# Patient Record
Sex: MALE | Race: WHITE | NOT HISPANIC OR LATINO | Employment: FULL TIME | URBAN - METROPOLITAN AREA
[De-identification: names, ages, dates, MRNs, and addresses within clinical notes are randomized per-mention and may not be internally consistent; named-entity substitution may affect disease eponyms.]

---

## 2024-06-29 ENCOUNTER — HOSPITAL ENCOUNTER (EMERGENCY)
Facility: HOSPITAL | Age: 49
Discharge: HOME | End: 2024-06-29
Attending: EMERGENCY MEDICINE
Payer: COMMERCIAL

## 2024-06-29 ENCOUNTER — APPOINTMENT (OUTPATIENT)
Dept: RADIOLOGY | Facility: HOSPITAL | Age: 49
End: 2024-06-29
Payer: COMMERCIAL

## 2024-06-29 VITALS
WEIGHT: 250 LBS | SYSTOLIC BLOOD PRESSURE: 147 MMHG | OXYGEN SATURATION: 92 % | HEART RATE: 80 BPM | DIASTOLIC BLOOD PRESSURE: 88 MMHG | TEMPERATURE: 98 F | RESPIRATION RATE: 16 BRPM | BODY MASS INDEX: 33.86 KG/M2 | HEIGHT: 72 IN

## 2024-06-29 DIAGNOSIS — S06.0X0A CONCUSSION WITHOUT LOSS OF CONSCIOUSNESS, INITIAL ENCOUNTER: Primary | ICD-10-CM

## 2024-06-29 PROCEDURE — 96374 THER/PROPH/DIAG INJ IV PUSH: CPT

## 2024-06-29 PROCEDURE — 96375 TX/PRO/DX INJ NEW DRUG ADDON: CPT

## 2024-06-29 PROCEDURE — 72125 CT NECK SPINE W/O DYE: CPT

## 2024-06-29 PROCEDURE — 99285 EMERGENCY DEPT VISIT HI MDM: CPT | Mod: 25

## 2024-06-29 PROCEDURE — 70450 CT HEAD/BRAIN W/O DYE: CPT | Performed by: RADIOLOGY

## 2024-06-29 PROCEDURE — 70450 CT HEAD/BRAIN W/O DYE: CPT

## 2024-06-29 PROCEDURE — 2500000004 HC RX 250 GENERAL PHARMACY W/ HCPCS (ALT 636 FOR OP/ED): Performed by: EMERGENCY MEDICINE

## 2024-06-29 PROCEDURE — 72125 CT NECK SPINE W/O DYE: CPT | Performed by: RADIOLOGY

## 2024-06-29 RX ORDER — FENTANYL CITRATE 50 UG/ML
50 INJECTION, SOLUTION INTRAMUSCULAR; INTRAVENOUS ONCE
Status: COMPLETED | OUTPATIENT
Start: 2024-06-29 | End: 2024-06-29

## 2024-06-29 RX ORDER — ONDANSETRON HYDROCHLORIDE 2 MG/ML
4 INJECTION, SOLUTION INTRAVENOUS ONCE
Status: COMPLETED | OUTPATIENT
Start: 2024-06-29 | End: 2024-06-29

## 2024-06-29 RX ADMIN — FENTANYL CITRATE 50 MCG: 50 INJECTION INTRAMUSCULAR; INTRAVENOUS at 20:34

## 2024-06-29 RX ADMIN — ONDANSETRON 4 MG: 2 INJECTION INTRAMUSCULAR; INTRAVENOUS at 20:34

## 2024-06-29 ASSESSMENT — PAIN SCALES - GENERAL
PAINLEVEL_OUTOF10: 4
PAINLEVEL_OUTOF10: 3

## 2024-06-29 ASSESSMENT — LIFESTYLE VARIABLES
EVER FELT BAD OR GUILTY ABOUT YOUR DRINKING: NO
HAVE PEOPLE ANNOYED YOU BY CRITICIZING YOUR DRINKING: NO
TOTAL SCORE: 0
EVER HAD A DRINK FIRST THING IN THE MORNING TO STEADY YOUR NERVES TO GET RID OF A HANGOVER: NO
HAVE YOU EVER FELT YOU SHOULD CUT DOWN ON YOUR DRINKING: NO

## 2024-06-29 ASSESSMENT — PAIN DESCRIPTION - FREQUENCY: FREQUENCY: CONSTANT/CONTINUOUS

## 2024-06-29 ASSESSMENT — COLUMBIA-SUICIDE SEVERITY RATING SCALE - C-SSRS
1. IN THE PAST MONTH, HAVE YOU WISHED YOU WERE DEAD OR WISHED YOU COULD GO TO SLEEP AND NOT WAKE UP?: NO
6. HAVE YOU EVER DONE ANYTHING, STARTED TO DO ANYTHING, OR PREPARED TO DO ANYTHING TO END YOUR LIFE?: NO
2. HAVE YOU ACTUALLY HAD ANY THOUGHTS OF KILLING YOURSELF?: NO

## 2024-06-29 ASSESSMENT — PAIN DESCRIPTION - LOCATION: LOCATION: HEAD

## 2024-06-29 ASSESSMENT — PAIN DESCRIPTION - DESCRIPTORS: DESCRIPTORS: SQUEEZING;PRESSURE

## 2024-06-29 ASSESSMENT — PAIN - FUNCTIONAL ASSESSMENT: PAIN_FUNCTIONAL_ASSESSMENT: 0-10

## 2024-06-29 ASSESSMENT — PAIN DESCRIPTION - PAIN TYPE: TYPE: CHRONIC PAIN

## 2024-06-29 NOTE — Clinical Note
García Harry was seen and treated in our emergency department on 6/29/2024.  He may return to work on 07/08/2024.       If you have any questions or concerns, please don't hesitate to call.      Georgia Morrison MD

## 2024-06-29 NOTE — ED TRIAGE NOTES
"Patient arrived to ED from work with c/o falling backwards off semi, approx. 2ft. Patient reports his legs gave out when he went to step up onto the lift of the semi, [facility] but they are still working on getting him to a neurologist. Patient denies LOC, denies blood thinners. Patient report he hit left parietal side of head, has a red [place] , patient reports some \"seepage\" of blood on sight, no bleeding noted upon arrival to ED. Patients wife noticed that patients speech changed, he a 1 bought of emesis on ride to ED. Patient is nauseous. Patient noticed pupil changes, reports they were smaller. Patient is stable at this time.   "

## 2024-06-30 NOTE — ED PROVIDER NOTES
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  He states that his legs felt weak and somewhat gave out on him.  He fell over backwards.  He did hit his head.  He did not have loss of consciousness.  He initially seemed fine.  They were monitoring him at home.  Wife states that he started to sound strange like he was underwater and she had difficulty getting him focused.  On the way to the emergency department he did have nausea with vomiting x 1.  He is not on any blood thinning medications.  Patient has chronic weakness that has been going on for months to a year.  He [facility] but has not been able to be seen yet.  He states that he has difficulty when he is laying on the floor to be able to stand without using something to support himself.  This is not a new problem for him this is something it has been going on for a while.                          Ruby Coma Scale Score: 15                  Patient History   No past medical history on file.  Past Surgical History:   Procedure Laterality Date    MOUTH SURGERY  08/16/2013    Oral Surgery Tooth Extraction     No family history on file.  Social History     Tobacco Use    Smoking status: Not on file    Smokeless tobacco: Not on file   Substance Use Topics    Alcohol use: Not on file    Drug use: Not on file       Physical Exam   ED Triage Vitals [06/29/24 1939]   Temperature Heart Rate Respirations BP   36.7 °C (98 °F) 86 (!) 21 143/89      Pulse Ox Temp Source Heart Rate Source Patient Position   97 % Oral Monitor --      BP Location FiO2 (%)     -- --       Physical Exam  Vitals and nursing note reviewed.   Constitutional:       Appearance: Normal appearance.   HENT:      Head:      Comments: Patient has an  abrasion to the left side of his scalp.  No laceration.  No active bleeding.  He does have tenderness to this area.     Right Ear: Tympanic membrane normal.      Left Ear: Tympanic membrane normal.      Nose: Nose normal.      Mouth/Throat:      Mouth: Mucous membranes are moist.   Eyes:      Extraocular Movements: Extraocular movements intact.      Pupils: Pupils are equal, round, and reactive to light.   Cardiovascular:      Rate and Rhythm: Normal rate and regular rhythm.      Pulses: Normal pulses.      Heart sounds: Normal heart sounds.   Pulmonary:      Effort: Pulmonary effort is normal.      Breath sounds: Normal breath sounds.   Abdominal:      General: Abdomen is flat. Bowel sounds are normal.      Palpations: Abdomen is soft.   Musculoskeletal:         General: Normal range of motion.      Cervical back: Normal range of motion. No tenderness.   Skin:     General: Skin is warm and dry.      Capillary Refill: Capillary refill takes less than 2 seconds.   Neurological:      General: No focal deficit present.      Mental Status: He is alert and oriented to person, place, and time.   Psychiatric:         Mood and Affect: Mood normal.         Behavior: Behavior normal.       Labs Reviewed - No data to display  Pain Management Panel           No data to display              CT head wo IV contrast    (Results Pending)   CT cervical spine wo IV contrast    (Results Pending)     ED Course & MDM   Diagnoses as of 06/29/24 2204   Concussion without loss of consciousness, initial encounter       Medical Decision Making  Patient presents secondary to a fall with head injury.  Patient did have nausea with vomiting.  Patient also has seemed off since the fall.  Patient is evaluated in the emergency department CT head and cervical spine.  CT head and cervical spine shows no evidence of acute traumatic injury.  Patient has dizziness and nausea which is consistent with a concussion.  At this time patient should not be  driving or operating machinery if he has concussion symptoms.  He is written off work for a week.  He is to follow-up with employee health for extension of work restrictions if needed.  He is stable for discharge at this time.        Procedure  Procedures     Georgia Morrison MD  06/29/24 6004

## 2025-02-04 ENCOUNTER — OFFICE VISIT (OUTPATIENT)
Dept: URGENT CARE | Age: 50
End: 2025-02-04
Payer: OTHER GOVERNMENT

## 2025-02-04 VITALS
SYSTOLIC BLOOD PRESSURE: 136 MMHG | RESPIRATION RATE: 16 BRPM | TEMPERATURE: 96.7 F | HEART RATE: 93 BPM | DIASTOLIC BLOOD PRESSURE: 86 MMHG | OXYGEN SATURATION: 96 %

## 2025-02-04 DIAGNOSIS — J02.9 PHARYNGITIS, UNSPECIFIED ETIOLOGY: Primary | ICD-10-CM

## 2025-02-04 DIAGNOSIS — J02.9 SORE THROAT: ICD-10-CM

## 2025-02-04 LAB — POC RAPID STREP: NEGATIVE

## 2025-02-04 RX ORDER — AMOXICILLIN 500 MG/1
500 CAPSULE ORAL 2 TIMES DAILY
Qty: 20 CAPSULE | Refills: 0 | Status: SHIPPED | OUTPATIENT
Start: 2025-02-04 | End: 2025-02-14

## 2025-02-04 NOTE — PROGRESS NOTES
Subjective   Patient ID: García Harry is a 50 y.o. male. They present today with a chief complaint of Sore Throat (Sore throat x 2 days, son has strep).    History of Present Illness  Klaus Harry is a 50 y.o. male. They present today with a chief complaint of Sore Throat (Sore throat x 2 days, son has strep). Here for strep test    Past Medical History  Allergies as of 02/04/2025    (No Known Allergies)       (Not in a hospital admission)       No past medical history on file.    Past Surgical History:   Procedure Laterality Date    MOUTH SURGERY  08/16/2013    Oral Surgery Tooth Extraction            Review of Systems  Review of Systems  Sore throat    Objective    Vitals:    02/04/25 1223   BP: 136/86   Pulse: 93   Resp: 16   Temp: 35.9 °C (96.7 °F)   SpO2: 96%     No LMP for male patient.    Physical Exam  Erythematous pharynx  Procedures    Point of Care Test & Imaging Results from this visit  Results for orders placed or performed in visit on 02/04/25   POCT rapid strep A manually resulted   Result Value Ref Range    POC Rapid Strep Negative Negative      No results found.    Diagnostic study results (if any) were reviewed by CATARINO Wolf.    Assessment/Plan   Allergies, medications, history, and pertinent labs/EKGs/Imaging reviewed by CATARINO Wolf.     Medical Decision Making  - strep test however given exposure, reasonable for antibx for phayrngitis    Amox sent    Otc tylenol and motrin as discussed    Follow up with pcp    As a result of the work-up, the patient was discharged home.  he was informed of his diagnosis and instructed to come back with any concerns or worsening of condition.  he and was agreeable to the plan as discussed above.  he was given the opportunity to ask questions.  All of the patient's questions were answered.    This document was generated using the assistance of voice recognition software. If there are any errors of spelling, grammar, syntax,  or meaning; please feel free to contact me directly for clarification.     Orders and Diagnoses  Diagnoses and all orders for this visit:  Pharyngitis, unspecified etiology  -     amoxicillin (Amoxil) 500 mg capsule; Take 1 capsule (500 mg) by mouth 2 times a day for 10 days.  Sore throat  -     POCT rapid strep A manually resulted  -     amoxicillin (Amoxil) 500 mg capsule; Take 1 capsule (500 mg) by mouth 2 times a day for 10 days.      Medical Admin Record      Patient disposition: Home    Electronically signed by CATARINO Wolf  12:37 PM

## 2025-03-14 ENCOUNTER — OFFICE VISIT (OUTPATIENT)
Dept: URGENT CARE | Age: 50
End: 2025-03-14
Payer: OTHER GOVERNMENT

## 2025-03-14 VITALS
DIASTOLIC BLOOD PRESSURE: 84 MMHG | OXYGEN SATURATION: 98 % | HEART RATE: 88 BPM | TEMPERATURE: 98.7 F | RESPIRATION RATE: 16 BRPM | SYSTOLIC BLOOD PRESSURE: 131 MMHG

## 2025-03-14 DIAGNOSIS — A08.4 VIRAL GASTROENTERITIS: Primary | ICD-10-CM

## 2025-03-14 ASSESSMENT — ENCOUNTER SYMPTOMS
CONSTITUTIONAL NEGATIVE: 1
NAUSEA: 1
VOMITING: 1
DIARRHEA: 1

## 2025-03-14 NOTE — LETTER
March 14, 2025     Patient: García Harry   YOB: 1975   Date of Visit: 3/14/2025       To Whom It May Concern:    García Harry was seen in my clinic on 3/14/2025 at 8:00 am. Please excuse García for his absence from work on this day to make the appointment.    If you have any questions or concerns, please don't hesitate to call.         Sincerely,         Jose Juan Monterroso PA-C        CC: No Recipients

## 2025-03-14 NOTE — PROGRESS NOTES
Subjective   Patient ID: García Harry is a 50 y.o. male. They present today with a chief complaint of N/V/D.    History of Present Illness  50-year-old male presents to clinic today with complaints of nausea vomiting diarrhea.  Patient states this been ongoing for about 2 days.  He originally started Wednesday night.  He states he has had some aches and chills.  Denies any true fever.  Denies blood in the stool.  Denies recent long travel.  Denies recent antibiotic usage.  He has been taking Pepto-Bismol which has helped with the diarrhea however is still ongoing.  He states that nausea has improved.  Originally had diffuse abdominal pain but that is also improved.          Past Medical History  Allergies as of 03/14/2025    (No Known Allergies)       (Not in a hospital admission)       No past medical history on file.    Past Surgical History:   Procedure Laterality Date    MOUTH SURGERY  08/16/2013    Oral Surgery Tooth Extraction            Review of Systems  Review of Systems   Constitutional: Negative.    Gastrointestinal:  Positive for diarrhea, nausea and vomiting.                                  Objective    Vitals:    03/14/25 0814   BP: 131/84   Pulse: 88   Resp: 16   Temp: 37.1 °C (98.7 °F)   SpO2: 98%     No LMP for male patient.    Physical Exam  Constitutional:       Appearance: Normal appearance.   Cardiovascular:      Rate and Rhythm: Normal rate and regular rhythm.      Heart sounds: Normal heart sounds.   Abdominal:      General: Abdomen is flat. Bowel sounds are normal.      Palpations: Abdomen is soft.   Neurological:      Mental Status: He is alert.         Procedures    Point of Care Test & Imaging Results from this visit  No results found for this visit on 03/14/25.   No results found.    Diagnostic study results (if any) were reviewed by Jose Juan Monterroso PA-C.    Assessment/Plan   Allergies, medications, history, and pertinent labs/EKGs/Imaging reviewed by Jose Juan Monterroso PA-C.     Medical  Decision Making  Patient pleasant cooperative on examination.    Abdominal examination benign.  Vital signs are stable.    Per patient history there is no concerning signs for bacterial etiology.    This is most likely a viral gastroenteritis versus food intolerance versus other.    Advised mom brat diet.  Continue with Pepto-Bismol and monitor for worsening signs.    Patient in agreement with plan.    Orders and Diagnoses  Diagnoses and all orders for this visit:  Viral gastroenteritis      Medical Admin Record      Patient disposition: Home    Electronically signed by Jose Juan Monterroso PA-C  8:59 AM